# Patient Record
Sex: FEMALE | Race: WHITE | Employment: FULL TIME | URBAN - METROPOLITAN AREA
[De-identification: names, ages, dates, MRNs, and addresses within clinical notes are randomized per-mention and may not be internally consistent; named-entity substitution may affect disease eponyms.]

---

## 2017-12-27 ENCOUNTER — HOSPITAL ENCOUNTER (OUTPATIENT)
Dept: ULTRASOUND IMAGING | Age: 32
Discharge: HOME | End: 2017-12-27
Attending: OBSTETRICS & GYNECOLOGY | Admitting: OBSTETRICS & GYNECOLOGY

## 2017-12-27 DIAGNOSIS — T83.32XA DISPLACEMENT OF INTRAUTERINE CONTRACEPTIVE DEVICE: ICD-10-CM

## 2017-12-27 DIAGNOSIS — T83.32XA INTRAUTERINE CONTRACEPTIVE DEVICE THREADS LOST, INITIAL ENCOUNTER: ICD-10-CM

## 2022-06-12 ENCOUNTER — APPOINTMENT (OUTPATIENT)
Dept: GENERAL RADIOLOGY | Age: 37
End: 2022-06-12
Payer: COMMERCIAL

## 2022-06-12 ENCOUNTER — HOSPITAL ENCOUNTER (EMERGENCY)
Age: 37
Discharge: HOME | End: 2022-06-12
Attending: EMERGENCY MEDICINE
Payer: COMMERCIAL

## 2022-06-12 VITALS
HEIGHT: 66 IN | BODY MASS INDEX: 25.19 KG/M2 | RESPIRATION RATE: 17 BRPM | WEIGHT: 156.75 LBS | OXYGEN SATURATION: 100 % | SYSTOLIC BLOOD PRESSURE: 134 MMHG | DIASTOLIC BLOOD PRESSURE: 81 MMHG | HEART RATE: 89 BPM | TEMPERATURE: 98.7 F

## 2022-06-12 DIAGNOSIS — M25.542 ARTHRALGIA OF LEFT HAND: Primary | ICD-10-CM

## 2022-06-12 DIAGNOSIS — M79.642 LEFT HAND PAIN: ICD-10-CM

## 2022-06-12 PROCEDURE — 73130 X-RAY EXAM OF HAND: CPT

## 2022-06-12 PROCEDURE — 99283 EMERGENCY DEPT VISIT LOW MDM: CPT

## 2022-06-12 RX ORDER — COLCHICINE 0.6 MG/1
0.6 TABLET ORAL DAILY
Qty: 30 TABLET | Refills: 3 | Status: SHIPPED | OUTPATIENT
Start: 2022-06-12 | End: 2022-10-18

## 2022-06-12 RX ORDER — INDOMETHACIN 25 MG/1
25 CAPSULE ORAL ONCE
Status: DISCONTINUED | OUTPATIENT
Start: 2022-06-12 | End: 2022-06-12

## 2022-06-12 RX ORDER — HYDROCODONE BITARTRATE AND ACETAMINOPHEN 5; 325 MG/1; MG/1
1 TABLET ORAL EVERY 4 HOURS PRN
Qty: 10 TABLET | Refills: 0 | Status: SHIPPED | OUTPATIENT
Start: 2022-06-12 | End: 2022-06-15

## 2022-06-12 RX ORDER — INDOMETHACIN 50 MG/1
50 CAPSULE ORAL 2 TIMES DAILY WITH MEALS
Qty: 60 CAPSULE | Refills: 0 | Status: SHIPPED | OUTPATIENT
Start: 2022-06-12 | End: 2022-10-18

## 2022-06-12 RX ORDER — COLCHICINE 0.6 MG/1
0.6 TABLET ORAL DAILY
Status: DISCONTINUED | OUTPATIENT
Start: 2022-06-12 | End: 2022-06-12

## 2022-06-12 ASSESSMENT — PAIN DESCRIPTION - PAIN TYPE
TYPE: ACUTE PAIN
TYPE: ACUTE PAIN

## 2022-06-12 ASSESSMENT — PAIN DESCRIPTION - ORIENTATION
ORIENTATION: LEFT
ORIENTATION: LEFT

## 2022-06-12 ASSESSMENT — PAIN DESCRIPTION - LOCATION
LOCATION: HAND
LOCATION: HAND

## 2022-06-12 ASSESSMENT — PAIN DESCRIPTION - FREQUENCY
FREQUENCY: INTERMITTENT
FREQUENCY: INTERMITTENT

## 2022-06-12 ASSESSMENT — PAIN DESCRIPTION - DESCRIPTORS
DESCRIPTORS: ACHING
DESCRIPTORS: ACHING

## 2022-06-12 ASSESSMENT — PAIN - FUNCTIONAL ASSESSMENT
PAIN_FUNCTIONAL_ASSESSMENT: PREVENTS OR INTERFERES SOME ACTIVE ACTIVITIES AND ADLS
PAIN_FUNCTIONAL_ASSESSMENT: PREVENTS OR INTERFERES SOME ACTIVE ACTIVITIES AND ADLS
PAIN_FUNCTIONAL_ASSESSMENT: 0-10

## 2022-06-12 ASSESSMENT — PAIN SCALES - GENERAL
PAINLEVEL_OUTOF10: 6
PAINLEVEL_OUTOF10: 6

## 2022-06-12 NOTE — ED TRIAGE NOTES
Patient has been having left hand pain since 6/10/22. No injury or fall. Having some numbness or tingling off and on. Left radial pulse strongly palpable  Pain is an ache, until she uses it, and then becomes shooting. Has been taking Ibuprofen PRN. Last dose was around 0900.

## 2022-06-12 NOTE — ED PROVIDER NOTES
eMERGENCY dEPARTMENT eNCOUnter        [address] [facility]    Chief Complaint   Patient presents with    Hand Pain     Left hand pain since 6/10/22. No injury or fall. HPI    Reina Figueroa is a 42 y.o. female who presents to the ER for evaluation of swelling and edema of the left hand distal at the MCP, no known fracture dislocation no known trauma, no prior history of gouty arthritis or other inflammatory arthropathy. Afebrile. No erythema or edema. REVIEW OF SYSTEMS    See HPI for further details. Review of systems otherwise negative. PAST MEDICAL HISTORY    History reviewed. No pertinent past medical history. SURGICAL HISTORY    Past Surgical History:   Procedure Laterality Date     SECTION      ,,    HERNIA REPAIR         CURRENT MEDICATIONS        ALLERGIES    Allergies   Allergen Reactions    Ciprofloxacin      Other reaction(s): Chest Pain  CHEST TIGHTENING         FAMILY HISTORY    History reviewed. No pertinent family history. SOCIAL HISTORY    Social History     Socioeconomic History    Marital status:      Spouse name: None    Number of children: None    Years of education: None    Highest education level: None   Occupational History    None   Tobacco Use    Smoking status: Never Smoker    Smokeless tobacco: Never Used   Substance and Sexual Activity    Alcohol use: Yes     Comment: occasionally    Drug use: Never    Sexual activity: None   Other Topics Concern    None   Social History Narrative    None     Social Determinants of Health     Financial Resource Strain:     Difficulty of Paying Living Expenses: Not on file   Food Insecurity:     Worried About Running Out of Food in the Last Year: Not on file    Lurdes of Food in the Last Year: Not on file   Transportation Needs:     Lack of Transportation (Medical): Not on file    Lack of Transportation (Non-Medical):  Not on file   Physical Activity:     Days of Exercise per Week: Not on file    Minutes of Exercise per Session: Not on file   Stress:     Feeling of Stress : Not on file   Social Connections:     Frequency of Communication with Friends and Family: Not on file    Frequency of Social Gatherings with Friends and Family: Not on file    Attends Caodaism Services: Not on file    Active Member of [address] [place] [address] or Organizations: Not on file    Attends Club or Organization Meetings: Not on file    Marital Status: Not on file   Intimate Partner Violence:     Fear of Current or Ex-Partner: Not on file    Emotionally Abused: Not on file    Physically Abused: Not on file    Sexually Abused: Not on file   Housing Stability:     Unable to Pay for Housing in the Last Year: Not on file    Number of [place] in the Last Year: Not on file    Unstable Housing in the Last Year: Not on file       PHYSICAL EXAM    VITAL SIGNS: /81   Pulse 89   Temp 98.7 °F (37.1 °C) (Tympanic)   Resp 17   Ht 5' 6\" (1.676 m)   Wt 156 lb 12 oz (71.1 kg)   SpO2 100%   BMI 25.30 kg/m²   Constitutional:  Well developed, well nourished, no acute distress, non-toxic appearance   HENT:  Atraumatic, external ears normal, nose normal, oropharynx moist.  Neck- normal range of motion, no tenderness, supple   Respiratory:  No respiratory distress, normal breath sounds. Cardiovascular:  Normal rate, normal rhythm, no murmurs, no gallops, no rubs   GI:  Soft, nondistended, normal bowel sounds, nontender   Musculoskeletal: Positive mild tenderness and edema noted at the MCP of the middle finger and ring finger left, pain with flexion extension  Integument:  Well hydrated, no rash   Neurologic: No true sensory or motor deficit    RADIOLOGY/PROCEDURES    Hand left: Negative for fracture or dislocation  ED COURSE & MEDICAL DECISION MAKING    Pertinent Labs & Imaging studies reviewed.  (See chart for details)  Clear etiology of the patient's symptoms appears to be more inflammatory arthropathy versus possible reactive trauma and and contusion effusion. Anti-inflammatories analgesia. Patient follow-up with PCP and hand surgery if continued continued symptoms. FINAL IMPRESSION    1. Arthritis arthralgia left hand  2.           Yelena Fonseca MD  34/63/56 [id number]

## 2022-06-15 ENCOUNTER — OFFICE VISIT (OUTPATIENT)
Dept: DERMATOLOGY | Age: 37
End: 2022-06-15
Payer: COMMERCIAL

## 2022-06-15 ENCOUNTER — TELEPHONE (OUTPATIENT)
Dept: DERMATOLOGY | Age: 37
End: 2022-06-15

## 2022-06-15 DIAGNOSIS — L57.8 DIFFUSE PHOTODAMAGE OF SKIN: ICD-10-CM

## 2022-06-15 DIAGNOSIS — D48.5 NEOPLASM OF UNCERTAIN BEHAVIOR OF SKIN: ICD-10-CM

## 2022-06-15 DIAGNOSIS — C43.61 MALIGNANT MELANOMA OF RIGHT FOREARM (HCC): Primary | ICD-10-CM

## 2022-06-15 DIAGNOSIS — D22.9 BENIGN NEVUS: ICD-10-CM

## 2022-06-15 PROCEDURE — 99204 OFFICE O/P NEW MOD 45 MIN: CPT | Performed by: DERMATOLOGY

## 2022-06-15 RX ORDER — FEXOFENADINE HYDROCHLORIDE 60 MG/1
60 TABLET, FILM COATED ORAL DAILY
COMMUNITY

## 2022-06-15 RX ORDER — LEVONORGESTREL 52 MG/1
1 INTRAUTERINE DEVICE INTRAUTERINE ONCE
COMMUNITY

## 2022-06-15 NOTE — TELEPHONE ENCOUNTER
Called and LM for [address] [facility] staff to call the office regarding where the patients path results were read.

## 2022-06-15 NOTE — PROGRESS NOTES
[facility]) Dermatology  Sada Pereyra M.D.  [phone]       Rosa Bernabe  1985    42 y.o. female     Date of Visit: 6/15/2022    Chief Complaint:   Chief Complaint   Patient presents with    New Patient        I was asked to see this patient by Dr. Balderrama ref. provider found. History of Present Illness:  1. New patient-newly diagnosed melanoma    Patient presents today after new diagnosis of malignant melanoma Breslow depth 0.8 mm right forearm, margins clear, biopsy performed 6/9/2022. Patient states that she noticed a new nevus about 6 to 8 weeks ago on her right forearm. Watch this carefully and it became dark, irregular, and grew relatively quickly over about a month. She saw her primary care doctor who biopsied the lesion. Patient has no prior history of dysplastic nevi or skin cancer. No family history of skin cancer. No history of tanning bed use. Does have a history of outdoor sun exposure-spends time outside with her 3 children to play sports. 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 She has also noted a brown nevus on her right anterior thigh. Has been present for years but became inflamed months ago, and then improved. Seem to remain dark after inflammation. Has not grown or changed since then. Not aware of any other new or changing lesions that she is concerned about    Up-to-date with dentist, ophthalmologist, gynecologist          Review of Systems:  Constitutional: Reports general sense of well-being       Past Medical History, Surgical History, Family History, Medications and Allergies reviewed.     Social History:   Social History     Socioeconomic History    Marital status:      Spouse name: Not on file    Number of children: Not on file    Years of education: Not on file    Highest education level: Not on file   Occupational History    Not on file   Tobacco Use    Smoking status: Never Smoker    Smokeless tobacco: Never Used   Substance and Sexual Activity    Alcohol use: Yes     Comment: occasionally    Drug use: Never    Sexual activity: Not on file   Other Topics Concern    Not on file   Social History Narrative    Not on file     Social Determinants of Health     Financial Resource Strain:     Difficulty of Paying Living Expenses: Not on file   Food Insecurity:     Worried About Running Out of Food in the Last Year: Not on file    Lurdes of Food in the Last Year: Not on file   Transportation Needs:     Lack of Transportation (Medical): Not on file    Lack of Transportation (Non-Medical): Not on file   Physical Activity:     Days of Exercise per Week: Not on file    Minutes of Exercise per Session: Not on file   Stress:     Feeling of Stress : Not on file   Social Connections:     Frequency of Communication with Friends and Family: Not on file    Frequency of Social Gatherings with Friends and Family: Not on file    Attends Yazidism Services: Not on file    Active Member of [address] [place] [address] or Organizations: Not on file    Attends Club or Organization Meetings: Not on file    Marital Status: Not on file   Intimate Partner Violence:     Fear of Current or Ex-Partner: Not on file    Emotionally Abused: Not on file    Physically Abused: Not on file    Sexually Abused: Not on file   Housing Stability:     Unable to Pay for Housing in the Last Year: Not on file    Number of [place] in the Last Year: Not on file    Unstable Housing in the Last Year: Not on file       Physical Examination       -General: Well-appearing, NAD  Normal affect. Total body skin exam including scalp, face, neck, chest, abdomen, back, bilateral upper extremities, bilateral lower extremities, ocular conjunctiva, external lips, and nails was performed. Examination normal unless stated below. Underwear area not examined.   Scattered on the trunk and extremities are multiple well-defined round and oval symmetric smoothly-bordered uniformly brown macules and papules. Granulating biopsy site right forearm with no residual pigment. Diffuse background photodamage with freckling and lentigines. No lymphadenopathy right axilla  Right lower back 9 mm irregular brown papule-possible congenital nevus, rule out atypia        Right mid anterior thigh 3 mm dark brown papule rule out atypia    Assessment and Plan     1. Malignant melanoma of right forearm (HCC)-discussed lesion with patient and risk of metastatic disease/lymph node involvement right axilla. Would recommend sentinel lymph node biopsy given age and health status despite relatively low risk of axillary lymph node involvement. Also recommended a second opinion on pathology to confirm initial diagnosis of melanoma as well as Breslow depth and risk parameters. Would also recommend [employer] genetic testing for further risk stratification. Discussed the importance of sun avoidance, sun protection. Reviewed increased risk of a second primary melanoma in the importance of monitoring her skin carefully for change. We will also plan to see her every 4 months for the first year after diagnosis and then as it is dictated by her clinical progress. 2. Neoplasm of uncertain behavior of skin-right mid anterior thigh--Discussed possible diagnosis. Patient agreeable to biopsy. Verbal consent obtained after risks (infection, bleeding, scar), benefits and alternatives explained. -Area(s) to be biopsied were marked with a surgical pen. Site(s) were cleansed with alcohol. Local anesthesia achieved with 1% lidocaine with epinephrine/sodium bicarbonate. Shave biopsy performed with a razor blade. Hemostasis was achieved with aluminum chloride. The wound(s) were dressed with petrolatum and covered with a bandage. Specimen(s) sent to pathology. Pt educated re: risk of bleeding, infection, scar and wound care instructions. Right lower back-congenital nevus versus dysplastic-recommend excision.   We will see if this can be coordinated at the same time as her wide local excision right forearm with surgical oncologist if not, I would be happy to see her back for excision in our office       3. Benign nevus - Benign acquired melanocytic nevi  -Recommend monthly self skin exams   -Educated regarding the ABCDEs of melanoma detection   -Call for any new/changing moles or concerning lesions  -Reviewed sun protective behavior -- sun avoidance during the peak hours of the day, sun-protective clothing (including hat and sunglasses), sunscreen use (water resistant, broad spectrum, SPF at least 30, need for reapplication every 2 to 3 hours), avoidance of tanning beds      4. Diffuse photodamage of skin - Discussed changes in skin from chronic UV exposure and ways to mitigate further damage- hats when outside, SPF 50 or higher that is reapplied regularly, daily SPF application, UV protective clothing, and sun avoidance. Follow up 4 months-    Would like to officially request transfer of original pathology specimen from right forearm to [facility] for second opinion.

## 2022-06-15 NOTE — Clinical Note
Pls Have path from right forearm tx to [place] for second opinion (I out note at the bottom of my office note). Pls fax my office note/ pathology from right forearm to [facility] with note that second opinion re pathology is pending.  4 mo follow up

## 2022-06-20 ENCOUNTER — TELEPHONE (OUTPATIENT)
Dept: DERMATOLOGY | Age: 37
End: 2022-06-20

## 2022-06-20 LAB — DERMATOLOGY PATHOLOGY REPORT: NORMAL

## 2022-06-20 NOTE — TELEPHONE ENCOUNTER
Spoke with patient to notify that Dr. Evaristo Harrell had requested for Fabien Ordonez to send her path slides to be read by another lab and once she received those results, the information will be sent to Dr. Shakira Bae office. Patient stated understanding.

## 2022-06-20 NOTE — TELEPHONE ENCOUNTER
Pt says that she has not heard from Dr. Umer Gauthier, the surgeon's office. She is asking if there is a phone number she can call.

## 2022-06-21 ENCOUNTER — TELEPHONE (OUTPATIENT)
Dept: DERMATOLOGY | Age: 37
End: 2022-06-21

## 2022-06-21 DIAGNOSIS — D22.5: ICD-10-CM

## 2022-06-21 DIAGNOSIS — C43.61 MALIGNANT MELANOMA OF SKIN OF ARM, RIGHT (HCC): Primary | ICD-10-CM

## 2022-06-21 NOTE — TELEPHONE ENCOUNTER
----- Message from Adolfo Pineda MD sent at 6/15/2022  5:48 PM EDT -----  Pls Have path from right forearm tx to [facility] for second opinion (I out note at the bottom of my office note). Pls fax my office note/ pathology from right forearm to [facility] with note that second opinion re pathology is pending.  4 mo follow up

## 2022-06-29 LAB — DERMATOLOGY PATHOLOGY REPORT: ABNORMAL

## 2022-06-29 NOTE — PROGRESS NOTES
Spoke with patient re [facility] path on MM-patient met with Luda Bowie regarding wide local excision, which is planned for August.  She plans to still proceed with a sentinel lymph node biopsy. He will also excise nevus on her lower back. Patient states that crust from biopsy right forearm has just fallen off-notices some dyspigmentation at [facility] if this is still healing from biopsy versus residual pigment. She will monitor this carefully over the next week and if it does not resolve as she heals, let Dr. Milana Velasquez know to see if her procedure can be moved up. She also can let me know and we could consider a saucerization if needed of residual pigment.

## 2022-10-18 ENCOUNTER — OFFICE VISIT (OUTPATIENT)
Dept: DERMATOLOGY | Age: 37
End: 2022-10-18
Payer: COMMERCIAL

## 2022-10-18 DIAGNOSIS — Z85.820 HISTORY OF MELANOMA: ICD-10-CM

## 2022-10-18 DIAGNOSIS — D22.9 BENIGN NEVUS: ICD-10-CM

## 2022-10-18 DIAGNOSIS — L57.8 DIFFUSE PHOTODAMAGE OF SKIN: ICD-10-CM

## 2022-10-18 DIAGNOSIS — D48.5 NEOPLASM OF UNCERTAIN BEHAVIOR OF SKIN: Primary | ICD-10-CM

## 2022-10-18 PROCEDURE — 99213 OFFICE O/P EST LOW 20 MIN: CPT | Performed by: DERMATOLOGY

## 2022-10-18 PROCEDURE — 11102 TANGNTL BX SKIN SINGLE LES: CPT | Performed by: DERMATOLOGY

## 2022-10-18 RX ORDER — IBUPROFEN 200 MG
200 TABLET ORAL
COMMUNITY

## 2022-10-18 RX ORDER — EPINEPHRINE 0.3 MG/.3ML
INJECTION SUBCUTANEOUS
COMMUNITY
Start: 2020-11-16

## 2022-10-18 RX ORDER — ESCITALOPRAM OXALATE 10 MG/1
10 TABLET ORAL NIGHTLY
COMMUNITY
Start: 2022-08-07

## 2022-10-18 NOTE — PROGRESS NOTES
[facility]) Dermatology  Keisha Ellis M.D.  [phone]       Mak Bailey  1985    42 y.o. female     Date of Visit: 10/18/2022    Chief Complaint:   Chief Complaint   Patient presents with    Skin Lesion     Back of lt, rt leg        I was asked to see this patient by Dr. Balderrama ref. provider found. History of Present Illness:  1. Total-body skin exam    Photodamage. Progressive freckling and lentigines of sun exposed areas. Patient is wearing hats and sunscreen consistently. Multiple nevi-mostly stable in size, shape, color. Asymptomatic. Has noted 1 nevus left posterior shoulder that is newly raised and seems darker in color. Not itching, bleeding. 6/9/2022 malignant melanoma Breslow depth 0.8 mm with a mitotic rate of 3 right forearm status post wide local excision and negative sentinel lymph node biopsy 8/22 8/22 right lower back excision by [facility]-benign nevus    Review of Systems:  Constitutional: Reports general sense of well-being       Past Medical History, Surgical History, Family History, Medications and Allergies reviewed.     Social History:   Social History     Socioeconomic History    Marital status:      Spouse name: Not on file    Number of children: Not on file    Years of education: Not on file    Highest education level: Not on file   Occupational History    Not on file   Tobacco Use    Smoking status: Never    Smokeless tobacco: Never   Substance and Sexual Activity    Alcohol use: Yes     Comment: occasionally    Drug use: Never    Sexual activity: Not on file   Other Topics Concern    Not on file   Social History Narrative    Not on file     Social Determinants of Health     Financial Resource Strain: Not on file   Food Insecurity: Not on file   Transportation Needs: Not on file   Physical Activity: Not on file   Stress: Not on file   Social Connections: Not on file   Intimate Partner Violence: Not on file   Housing Stability: Not on file       Physical Examination       -General: Well-appearing, NAD  1. Normal affect. Total body skin exam including scalp, face, neck, chest, abdomen, back, bilateral upper extremities, bilateral lower extremities, ocular conjunctiva, external lips, and nails was performed. Examination normal unless stated below. Underwear area not examined. Scattered on the trunk and extremities are multiple well-defined round and oval symmetric smoothly-bordered uniformly brown macules and papules. Scattered dyspigmentation with multiple lentigines and vascular change consistent with chronic sun exposure. Symmetric evenly pigmented light tan lentigo right thigh. Well-healed scar at site of prior melanoma. No lymphadenopathy right axilla  Left posterior shoulder 0.35 mm raised pink and dark brown papule-irritated nevus. Assessment and Plan     1. Neoplasm of uncertain behavior of skin-left posterior shoulder--Discussed possible diagnosis. Patient agreeable to biopsy. Verbal consent obtained after risks (infection, bleeding, scar), benefits and alternatives explained. -Area(s) to be biopsied were marked with a surgical pen. Site(s) were cleansed with alcohol. Local anesthesia achieved with 1% lidocaine with epinephrine/sodium bicarbonate. Shave biopsy performed with a razor blade. Hemostasis was achieved with aluminum chloride. The wound(s) were dressed with petrolatum and covered with a bandage. Specimen(s) sent to pathology. Pt educated re: risk of bleeding, infection, scar and wound care instructions.      2. Benign nevus - Benign acquired melanocytic nevi  -Recommend monthly self skin exams   -Educated regarding the ABCDEs of melanoma detection   -Call for any new/changing moles or concerning lesions  -Reviewed sun protective behavior -- sun avoidance during the peak hours of the day, sun-protective clothing (including hat and sunglasses), sunscreen use (water resistant, broad spectrum, SPF at least 30, need for reapplication every 2 to 3 hours), avoidance of tanning beds      3. Diffuse photodamage of skin - Discussed changes in skin from chronic UV exposure and ways to mitigate further damage- hats when outside, SPF 50 or higher that is reapplied regularly, daily SPF application, UV protective clothing, and sun avoidance. 4. History of melanoma - No evidence of recurrence. Discussed risk of subsequent skin cancers and increased risk of melanoma. Reviewed importance of monitoring skin for change and sun protection with hats and sunscreen, as well as sun avoidance.   Remain up-to-date with dentist, ophthalmologist, gynecologist   Follow-up 4 months sooner if needed

## 2022-10-20 LAB — DERMATOLOGY PATHOLOGY REPORT: NORMAL

## 2023-02-21 ENCOUNTER — OFFICE VISIT (OUTPATIENT)
Dept: DERMATOLOGY | Age: 38
End: 2023-02-21
Payer: COMMERCIAL

## 2023-02-21 DIAGNOSIS — D22.9 BENIGN NEVUS: Primary | ICD-10-CM

## 2023-02-21 DIAGNOSIS — Z85.820 HISTORY OF MELANOMA: ICD-10-CM

## 2023-02-21 DIAGNOSIS — L57.8 DIFFUSE PHOTODAMAGE OF SKIN: ICD-10-CM

## 2023-02-21 PROCEDURE — 99213 OFFICE O/P EST LOW 20 MIN: CPT | Performed by: DERMATOLOGY

## 2023-02-21 NOTE — PROGRESS NOTES
[facility]) Dermatology  Kyle Erickson M.D.  [phone]       Umberto Horowitz  1985    42 y.o. female     Date of Visit: 2/21/2023    Chief Complaint:   Chief Complaint   Patient presents with    Skin Lesion        I was asked to see this patient by Dr. Balderrama ref. provider found. History of Present Illness:  1. TBSE    Multiple nevi. Patient has not noticed any new or changing pigmented lesions. Stable in size, shape, color. Not itching, bleeding. Photodamage. Progressive freckling and lentigines of sun exposed areas. Patient is wearing hats and sunscreen consistently. Up-to-date with dentist, ophthalmologist, gynecologist    2/23 3 children-ages 21, 12, 10     6/9/2022 malignant melanoma Breslow depth 0.8 mm with a mitotic rate of 3 right forearm status post wide local excision and negative sentinel lymph node biopsy 8/22 8/22 right lower back excision by [facility]-benign nevus    Patient has no prior history of dysplastic nevi or skin cancer. No family history of skin cancer. No history of tanning bed use. Does have a history of outdoor sun exposure-spends time outside with her 3 children to play sports. 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 Review of Systems:  Constitutional: Reports general sense of well-being       Past Medical History, Surgical History, Family History, Medications and Allergies reviewed.     Social History:   Social History     Socioeconomic History    Marital status:      Spouse name: Not on file    Number of children: Not on file    Years of education: Not on file    Highest education level: Not on file   Occupational History    Not on file   Tobacco Use    Smoking status: Never    Smokeless tobacco: Never   Substance and Sexual Activity    Alcohol use: Yes     Comment: occasionally    Drug use: Never    Sexual activity: Not on file   Other Topics Concern    Not on file   Social History Narrative    Not on file     Social Determinants of Health Financial Resource Strain: Not on file   Food Insecurity: Not on file   Transportation Needs: Not on file   Physical Activity: Not on file   Stress: Not on file   Social Connections: Not on file   Intimate Partner Violence: Not on file   Housing Stability: Not on file       Physical Examination       -General: Well-appearing, NAD  1. Normal affect. Total body skin exam including scalp, face, neck, chest, abdomen, back, bilateral upper extremities, bilateral lower extremities, ocular conjunctiva, external lips, and nails was performed. Examination normal unless stated below. Underwear area not examined. Scattered on the trunk and extremities are multiple well-defined round and oval symmetric smoothly-bordered uniformly brown macules and papules. Background photodamage with freckling and lentigines. Well-healed scar at site of prior melanoma. No lymphadenopathy right axilla      Assessment and Plan     1. Benign nevus - Benign acquired melanocytic nevi  -Recommend monthly self skin exams   -Educated regarding the ABCDEs of melanoma detection   -Call for any new/changing moles or concerning lesions  -Reviewed sun protective behavior -- sun avoidance during the peak hours of the day, sun-protective clothing (including hat and sunglasses), sunscreen use (water resistant, broad spectrum, SPF at least 30, need for reapplication every 2 to 3 hours), avoidance of tanning beds      2. Diffuse photodamage of skin - Discussed changes in skin from chronic UV exposure and ways to mitigate further damage- hats when outside, SPF 50 or higher that is reapplied regularly, daily SPF application, UV protective clothing, and sun avoidance. 3. History of melanoma - No evidence of recurrence. Discussed risk of subsequent skin cancers and increased risk of melanoma. Reviewed importance of monitoring skin for change and sun protection with hats and sunscreen, as well as sun avoidance.

## 2023-06-21 ENCOUNTER — OFFICE VISIT (OUTPATIENT)
Dept: DERMATOLOGY | Age: 38
End: 2023-06-21
Payer: COMMERCIAL

## 2023-06-21 DIAGNOSIS — Z85.820 HISTORY OF MELANOMA: ICD-10-CM

## 2023-06-21 DIAGNOSIS — D22.9 BENIGN NEVUS: Primary | ICD-10-CM

## 2023-06-21 DIAGNOSIS — L57.8 DIFFUSE PHOTODAMAGE OF SKIN: ICD-10-CM

## 2023-06-21 PROCEDURE — 99213 OFFICE O/P EST LOW 20 MIN: CPT | Performed by: DERMATOLOGY

## 2023-06-21 NOTE — PROGRESS NOTES
[facility]) Dermatology  Shikha Valdez M.D.  [phone]       Alessandro Martinez  1985    34 y.o. female     Date of Visit: 6/21/2023    Chief Complaint:   Chief Complaint   Patient presents with    Skin Lesion        I was asked to see this patient by Dr. Balderrama ref. provider found. History of Present Illness:  1. Tbse    Multiple nevi. Patient has not noticed any new or changing pigmented lesions. Stable in size, shape, color. Not itching, bleeding. Slightly irregular 3 mm brown papule left distal anterior lower leg-patient has been monitoring, no change to size, shape, color    Photodamage. Progressive freckling and lentigines of sun exposed areas. Patient is wearing hats and sunscreen consistently. Wearing sunscreen daily, UPF clothing for sporting events       2/23 3 children-ages 21, 12, 10     6/9/2022 malignant melanoma Breslow depth 0.8 mm with a mitotic rate of 3 right forearm status post wide local excision and negative sentinel lymph node biopsy 8/22 8/22 right lower back excision by [facility]-benign nevus     Patient has no prior history of dysplastic nevi or skin cancer. No family history of skin cancer. No history of tanning bed use. Does have a history of outdoor sun exposure-spends time outside with her 3 children to play sports. 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 Review of Systems:  Constitutional: Reports general sense of well-being       Past Medical History, Surgical History, Family History, Medications and Allergies reviewed.     Social History:   Social History     Socioeconomic History    Marital status:      Spouse name: Not on file    Number of children: Not on file    Years of education: Not on file    Highest education level: Not on file   Occupational History    Not on file   Tobacco Use    Smoking status: Never    Smokeless tobacco: Never   Substance and Sexual Activity    Alcohol use: Yes     Comment: occasionally    Drug use: Never    Sexual

## 2023-12-11 ENCOUNTER — OFFICE VISIT (OUTPATIENT)
Dept: DERMATOLOGY | Age: 38
End: 2023-12-11
Payer: COMMERCIAL

## 2023-12-11 DIAGNOSIS — D22.9 BENIGN NEVUS: ICD-10-CM

## 2023-12-11 DIAGNOSIS — L57.8 DIFFUSE PHOTODAMAGE OF SKIN: ICD-10-CM

## 2023-12-11 DIAGNOSIS — D48.5 NEOPLASM OF UNCERTAIN BEHAVIOR OF SKIN: Primary | ICD-10-CM

## 2023-12-11 DIAGNOSIS — Z85.820 HISTORY OF MELANOMA: ICD-10-CM

## 2023-12-11 PROCEDURE — 99213 OFFICE O/P EST LOW 20 MIN: CPT | Performed by: DERMATOLOGY

## 2023-12-11 PROCEDURE — 11102 TANGNTL BX SKIN SINGLE LES: CPT | Performed by: DERMATOLOGY

## 2023-12-11 NOTE — PROGRESS NOTES
[facility]  Chrissie Monroy M.D.  [phone]       Mansi Swartz  1985    34 y.o. female     Date of Visit: 12/11/2023    Chief Complaint:   Chief Complaint   Patient presents with    Skin Lesion     6 mo FSE        HX:Neoplasm/skin        I was asked to see this patient by Dr. Balderrama ref. provider found. History of Present Illness:  1. TBSE    Multiple nevi. Patient has not noticed any new or changing pigmented lesions. Stable in size, shape, color. Not itching, bleeding. Photodamage. Progressive freckling and lentigines of sun exposed areas. Patient is wearing hats and sunscreen consistently. Nevus left distal medial lower leg-patient has been monitoring. Seems to have darkened, continues to be slightly irregular. Not itching, bleeding    Up-to-date with dentist, ophthalmologist, gynecologist        Wearing sunscreen daily, UPF clothing for sporting events        2/23 3 children-ages 21, 12, 10     6/9/2022 malignant melanoma Breslow depth 0.8 mm with a mitotic rate of 3 right forearm status post wide local excision and negative sentinel lymph node biopsy 8/22 8/22 right lower back excision by Cody-benign nevus    No family history of skin cancer. No history of tanning bed use. Does have a history of outdoor sun exposure-spends time outside with her 3 children to play sports. Patient is in a employee of [employer]. Review of Systems:  Constitutional: Reports general sense of well-being       Past Medical History, Surgical History, Family History, Medications and Allergies reviewed. Social History:   Social History     Socioeconomic History    Marital status:      Spouse name: Not on file    Number of children: Not on file    Years of education: Not on file    Highest education level: Not on file   Occupational History    Not on file   Tobacco Use    Smoking status: Never    Smokeless tobacco: Never   Substance and Sexual Activity    Alcohol use:  Yes

## 2023-12-12 LAB — MAMMOGRAPHY, EXTERNAL: NORMAL

## 2023-12-15 LAB — DERMATOLOGY PATHOLOGY REPORT: NORMAL

## 2024-06-11 ENCOUNTER — OFFICE VISIT (OUTPATIENT)
Dept: DERMATOLOGY | Age: 39
End: 2024-06-11
Payer: COMMERCIAL

## 2024-06-11 DIAGNOSIS — Z85.820 HISTORY OF MELANOMA: ICD-10-CM

## 2024-06-11 DIAGNOSIS — D22.9 BENIGN NEVUS: Primary | ICD-10-CM

## 2024-06-11 DIAGNOSIS — L57.8 DIFFUSE PHOTODAMAGE OF SKIN: ICD-10-CM

## 2024-06-11 PROCEDURE — 99213 OFFICE O/P EST LOW 20 MIN: CPT | Performed by: DERMATOLOGY

## 2024-06-11 NOTE — PROGRESS NOTES
[facility]  Simona Godinez M.D.  [phone]       Anabelle Storey  1985    39 y.o. female     Date of Visit: 6/11/2024    Chief Complaint:   Chief Complaint   Patient presents with    Skin Lesion        I was asked to see this patient by Dr. Balderrama ref. provider found.    History of Present Illness:  1. TBSE    Multiple nevi. Patient has not noticed any new or changing pigmented lesions.   Stable in size, shape, color. Not itching, bleeding.     Photodamage.  Progressive freckling and lentigines of sun exposed areas.  Patient is wearing hats and sunscreen consistently.     No new or changing lesions.  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  No history of tanning bed use.  Does have a history of outdoor sun exposure-spends time outside with her 3 children to play sports.  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    Review of Systems:  Constitutional: Reports general sense of well-being       Past Medical History, Surgical History, Family History, Medications and Allergies reviewed.    Social History:   Social History     Socioeconomic History    Marital status:      Spouse name: Not on file    Number of children: Not on file    Years of education: Not on file    Highest education level: Not on file   Occupational History    Not on file   Tobacco Use    Smoking status: Never    Smokeless tobacco: Never   Substance and Sexual Activity    Alcohol use: Yes     Comment: occasionally    Drug use: Never

## 2024-12-10 ENCOUNTER — OFFICE VISIT (OUTPATIENT)
Dept: DERMATOLOGY | Age: 39
End: 2024-12-10
Payer: COMMERCIAL

## 2024-12-10 DIAGNOSIS — D22.9 BENIGN NEVUS: Primary | ICD-10-CM

## 2024-12-10 DIAGNOSIS — Z85.820 HISTORY OF MELANOMA: ICD-10-CM

## 2024-12-10 DIAGNOSIS — L57.8 DIFFUSE PHOTODAMAGE OF SKIN: ICD-10-CM

## 2024-12-10 PROCEDURE — 99213 OFFICE O/P EST LOW 20 MIN: CPT | Performed by: DERMATOLOGY

## 2024-12-10 NOTE — PROGRESS NOTES
[facility]  Simona Godinez M.D.  [phone]       Anabelle Stoery  1985    39 y.o. female     Date of Visit: 12/10/2024    Chief Complaint:   Chief Complaint   Patient presents with    Skin Lesion        I was asked to see this patient by Dr. Balderrama ref. provider found.    History of Present Illness:  1. TBSE    Multiple nevi. Patient has not noticed any new or changing pigmented lesions.   Stable in size, shape, color. Not itching, bleeding.     Photodamage.  Progressive freckling and lentigines of sun exposed areas.  Patient is wearing hats and sunscreen consistently.       Up-to-date with dentist, ophthalmologist, gynecologist           Wearing sunscreen daily, UPF clothing for sporting events-baseball, softball        2/23 3 children-ages 21, 12, 10     6/9/2022 malignant melanoma Breslow depth 0.8 mm with a mitotic rate of 3 right forearm status post wide local excision and negative sentinel lymph node biopsy 8/22 8/22 right lower back excision by [facility]-benign nevus  12/23 left distal medial lower leg junctional Spitz nevus     No family history of skin cancer.  No history of tanning bed use.  Does have a history of outdoor sun exposure-spends time outside with her 3 children to play sports.  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    Review of Systems:  Constitutional: Reports general sense of well-being       Past Medical History, Surgical History, Family History, Medications and Allergies reviewed.    Social History:   Social History     Socioeconomic History    Marital status:      Spouse name: Not on file    Number of children: Not on file    Years of education: Not on file    Highest education level: Not on file   Occupational History    Not on file   Tobacco Use    Smoking status: Never    Smokeless tobacco: Never   Substance and Sexual Activity    Alcohol use: Yes     Comment: occasionally    Drug use: Never    Sexual activity: Not on file   Other Topics Concern    Not

## 2025-06-10 ENCOUNTER — OFFICE VISIT (OUTPATIENT)
Age: 40
End: 2025-06-10
Payer: COMMERCIAL

## 2025-06-10 DIAGNOSIS — Z85.820 HISTORY OF MELANOMA: ICD-10-CM

## 2025-06-10 DIAGNOSIS — L57.8 DIFFUSE PHOTODAMAGE OF SKIN: ICD-10-CM

## 2025-06-10 DIAGNOSIS — D22.9 BENIGN NEVUS: Primary | ICD-10-CM

## 2025-06-10 PROCEDURE — 99213 OFFICE O/P EST LOW 20 MIN: CPT | Performed by: DERMATOLOGY

## 2025-06-10 NOTE — PROGRESS NOTES
[facility]  Simona Godinez M.D.  [phone]       Anabelle Storey  1985    40 y.o. female     Date of Visit: 6/10/2025    Chief Complaint:   Chief Complaint   Patient presents with    Skin Lesion        I was asked to see this patient by Dr. Balderrama ref. provider found.    History of Present Illness:  1. TBSE    Multiple nevi. Patient has not noticed any new or changing pigmented lesions.   Stable in size, shape, color. Not itching, bleeding.     Photodamage.  Progressive freckling and lentigines of sun exposed areas.  Patient is wearing hats and sunscreen consistently.         Wearing sunscreen daily, UPF clothing for sporting events-baseball, softball        2/23 3 children-ages 21, 12, 10     6/9/2022 malignant melanoma Breslow depth 0.8 mm with a mitotic rate of 3 right forearm status post wide local excision and negative sentinel lymph node biopsy 8/22 8/22 right lower back excision by [facility]-benign nevus  12/23 left distal medial lower leg junctional Spitz nevus     No family history of skin cancer.  No history of tanning bed use.  Does have a history of outdoor sun exposure-spends time outside with her 3 children to play sports.  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    Review of Systems:  Constitutional: Reports general sense of well-being       Past Medical History, Surgical History, Family History, Medications and Allergies reviewed.    Social History:   Social History     Socioeconomic History    Marital status:      Spouse name: Not on file    Number of children: Not on file    Years of education: Not on file    Highest education level: Not on file   Occupational History    Not on file   Tobacco Use    Smoking status: Never    Smokeless tobacco: Never   Substance and Sexual Activity    Alcohol use: Yes     Comment: occasionally    Drug use: Never    Sexual activity: Not on file   Other Topics Concern    Not on file   Social History Narrative    Not on file     Social